# Patient Record
(demographics unavailable — no encounter records)

---

## 2019-05-24 NOTE — ERD
ER Documentation


Chief Complaint


Chief Complaint





PALPITATION WITH SOB, FATIGUE, ANXIETY





HPI


This is a 54-year-old woman with a history of anxiety and right breast cancer 


status post chemotherapy a week ago presenting with 1 week of paresthesias, 


palpitations, insomnia, "feeling anxious" and discomfort in her epigastrium.  


She states she has had all of the symptoms in the past usually associated with 


anxiety.  In the past she was prescribed amitriptyline although she is currently


noncompliant with that medication.  Patient denies chest pain or shortness of 


breath, no calf or leg swelling, no fevers or chills, no vomiting or diarrhea, 


no LOC.





ROS


All systems reviewed and are negative except as per history of present illness.





Medications


Home Meds


Active Scripts


Lorazepam* (Ativan*) 0.5 Mg Tablet, 0.5 MG PO Q8H PRN for ANXIETY, #15 TAB


   Prov:KARYNA ESTEBAN MD         5/24/19


Lorazepam* (Ativan*) 0.5 Mg Tablet, 0.5 MG PO Q8, #10 TAB


   Prov:REGINA ALBERT MD         7/14/18


Pantoprazole* (Protonix*) 40 Mg Tablet.dr, 40 MG PO DAILY, #20 TAB


   Prov:HERMELINDA BRYAN MD         3/21/18


Fluticasone Propionate* (Fluticasone Propionate* Nasal) 50 Mcg/Spray - 16 Gm 


[place].susp, 1 SPRAY NASAL BID for 30 Days, #1 BOTTLE


   TO EACH NOSTRIL


   Prov:SHELIA CARPIO MD         1/11/18


Cromolyn Sodium* (Cromolyn Sodium*) 4% - 10 Ml Drops, 1 DROP BOTH EYES QID for 


30 Days, #1 EA


   Prov:SHELIA CARPIO MD         1/11/18


Mag Hydrox/Al Hydrox/Simeth (Maalox Advanced Suspension) 355 Ml Oral.susp, 30 ML


PO PC MEALS BEDTIME PRN for GASTROINTESTINAL UPSET, #355


   Prov:SANDY ORR MD         8/14/17


Famotidine* (Pepcid*) 20 Mg Tablet, 20 MG PO BID for 14 Days, TAB


   Prov:SANDY ORR MD         8/14/17


Reported Medications


[Mag Ox ]   No Conflict Check


   6/8/17


Cholecalciferol* (Vitamin D3*) 1,000 Unit Tablet, 1000 UNIT PO DAILY, TAB


   6/8/17





Allergies


Allergies:  


Coded Allergies:  


     No Known Allergy (Unverified , 1/11/18)





PMhx/Soc


Anxiety, right breast carcinoma currently on chemotherapy,


History of Surgery:  Yes (BREAST AUGMENTATION)


Anesthesia Reaction:  No


Hx Neurological Disorder:  No


Hx Respiratory Disorders:  No


Hx Cardiac Disorders:  Yes (STATES HIGH BP AT TIMES)


Hx Psychiatric Problems:  Yes (anxiety)


Hx Miscellaneous Medical Probl:  Yes (breast cancer )


Hx Alcohol Use:  No


Hx Substance Use:  No


Hx Tobacco Use:  No





FmHx


Family History:  No diabetes





Physical Exam


Vitals





Vital Signs


  Date      Temp  Pulse  Resp  B/P (MAP)   Pulse Ox  O2          O2 Flow    FiO2


Time                                                 Delivery    Rate


   5/24/19  99.5     93    16      142/65        96


     14:51                           (90)





Physical Exam


GENERAL: Well-developed, well-nourished, anxious, afebrile


HEENT: Moist mucous membranes, pink conjunctiva, no cervical spine tenderness or


step-off deformities, no goiter, no jaundice or icterus, extraocular movements 


intact without pain. No submandibular induration, and no pharyngeal erythema


NEURO: Alert and oriented 3, cranial nerves II through XII intact bilaterally, 


pupils equal round reactive to light, no focal deficits or facial asymmetry, 


sensation intact distally Strength 5/5 in upper and lower extremities 


bilaterally


CARDIAC: Regular rate and rhythm, no murmurs rubs or gallops


LUNGS: Clear bilaterally no wheezing crackles or stridor


ABDOMEN: Soft nontender, no guarding, no rigidity, no rebound, no psoas sign no 


obturator sign. Normoactive bowel sounds


SKIN: Warm and dry to touch, no abrasions, contusions, or hematomas, no 


lacerations, no ecchymosis, no target lesions, and without ulcers


EXTREMITIES: No clubbing cyanosis or edema, calves are bilaterally symmetrical, 


no Homans sign, no popliteal cord sign. Distal pulses equal and bilateral


PSYCH: Anxious


Results 24 hrs





Current Medications


 Medications
   Dose
          Sig/Leslie
       Start Time
   Status  Last


 (Trade)       Ordered        Route
 PRN     Stop Time              Admin
Dose


                              Reason                                Admin


 Lorazepam
     0.5 mg         ONCE  ONCE
    5/24/19                



(Ativan)                      PO
            18:00



                                             5/24/19 18:01


                40 ml          ONCE  STAT
    5/24/19       DC       



Miscellaneous                 PO
            17:57




 Medication
                                5/24/19 17:58


 (Gi Cocktail


(2))


 Belladonna/
   2 tab          ONCE  STAT
    5/24/19       DC       



Phenobarbital                 PO
            17:57




  (Donnatal)                                5/24/19 17:58








Procedures/MDM


Patient was placed on cardiac monitor initial rhythm was a narrow complex 


tachycardia at 100 bpm.  Patient was afebrile.





I administered lorazepam 0.5 mg p.o. for his symptoms and a GI cocktail p.o.





EKG performed, read by me reveals a normal sinus rhythm 99 bpm, normal axis, 


narrow QRS complex, no concerning ST elevations or depressions.





Patient looks well and has no concerning points on history or physical exam, she


does have anxiety and is presenting with her typical anxiety symptoms she will 


be discharged from our ED to continue outpatient management with her PMD and 


oncologist.





Differential diagnoses considered, included but not limited to acute coronary 


syndrome, pulmonary embolism, aortic dissection, abdominal aortic aneurysm, 


sepsis, stroke, meningitis, encephalitis, pneumonia, appendicitis, 


cholecystitis, bowel obstruction, pyelonephritis, nephrolithiasis, cystitis, as 


well as metabolic, hematologic, and electrolyte abnormalities. As well as 


abscess, cellulitis, fractures, and dislocations.





Patient feels much better at this time, and vital signs are normal, symptoms 


have improved. I did give strict instructions to return to the ED if symptoms 


continue or worsen, patient will otherwise follow-up with primary care 


physician. Patient understood instructions and agreed to plan.





Disclaimer: Inadvertent spelling and grammatical errors are likely due to 


EHR/dictation software use and do not reflect on the overall quality of patient 


care. Also, please note that the electronic time recorded on this note does not 


necessarily reflect the actual time of the patient encounter.





Departure


Diagnosis:  


   Primary Impression:  


   Palpitations


   Additional Impressions:  


   Insomnia


   Insomnia type:  primary  Qualified Codes:  F51.01 - Primary insomnia


   Acute anxiety


Condition:  Good


Patient Instructions:  Anxiety Reaction, Insomnia


Referrals:  


[facility] (PCP)











KARYNA ESTEBAN MD             May 24, 2019 18:06